# Patient Record
Sex: FEMALE | Race: WHITE | NOT HISPANIC OR LATINO | Employment: OTHER | URBAN - METROPOLITAN AREA
[De-identification: names, ages, dates, MRNs, and addresses within clinical notes are randomized per-mention and may not be internally consistent; named-entity substitution may affect disease eponyms.]

---

## 2023-07-12 DIAGNOSIS — Z87.798 HISTORY OF CONGENITAL OR GENETIC CONDITION: Primary | ICD-10-CM

## 2023-11-23 ENCOUNTER — HOSPITAL ENCOUNTER (EMERGENCY)
Facility: HOSPITAL | Age: 38
Discharge: HOME | End: 2023-11-23
Payer: COMMERCIAL

## 2023-11-23 VITALS
DIASTOLIC BLOOD PRESSURE: 90 MMHG | HEART RATE: 82 BPM | TEMPERATURE: 97.2 F | OXYGEN SATURATION: 97 % | RESPIRATION RATE: 16 BRPM | BODY MASS INDEX: 23.04 KG/M2 | WEIGHT: 130 LBS | HEIGHT: 63 IN | SYSTOLIC BLOOD PRESSURE: 140 MMHG

## 2023-11-23 DIAGNOSIS — R10.33 PERIUMBILICAL ABDOMINAL PAIN: Primary | ICD-10-CM

## 2023-11-23 PROBLEM — D80.9 IMMUNOGLOBULIN DEFICIENCY (MULTI): Status: ACTIVE | Noted: 2020-01-01

## 2023-11-23 PROBLEM — D17.22 LIPOMA OF LEFT SHOULDER: Status: ACTIVE | Noted: 2019-01-03

## 2023-11-23 PROBLEM — M25.512 ARTHRALGIA OF LEFT ACROMIOCLAVICULAR JOINT: Status: ACTIVE | Noted: 2019-01-03

## 2023-11-23 PROBLEM — M22.2X9 PATELLOFEMORAL SYNDROME: Status: ACTIVE | Noted: 2023-11-23

## 2023-11-23 PROBLEM — G43.101 MIGRAINE WITH AURA AND WITH STATUS MIGRAINOSUS: Status: ACTIVE | Noted: 2023-11-23

## 2023-11-23 PROBLEM — N95.1 MENOPAUSAL SYMPTOMS: Status: ACTIVE | Noted: 2018-07-31

## 2023-11-23 PROBLEM — F41.9 ANXIETY: Status: ACTIVE | Noted: 2023-11-23

## 2023-11-23 PROBLEM — J45.990 EXERCISE-INDUCED ASTHMA (HHS-HCC): Status: ACTIVE | Noted: 2023-11-23

## 2023-11-23 PROBLEM — J18.9 PNEUMONIA: Status: ACTIVE | Noted: 2023-11-23

## 2023-11-23 PROBLEM — N64.52 NIPPLE DISCHARGE IN FEMALE: Status: ACTIVE | Noted: 2023-11-23

## 2023-11-23 PROBLEM — M75.22 BICIPITAL TENDINITIS, LEFT SHOULDER: Status: ACTIVE | Noted: 2021-04-16

## 2023-11-23 PROBLEM — M75.82 ROTATOR CUFF TENDONITIS, LEFT: Status: ACTIVE | Noted: 2019-01-03

## 2023-11-23 PROBLEM — R53.83 FATIGUE: Status: ACTIVE | Noted: 2023-11-23

## 2023-11-23 PROBLEM — G44.229 CHRONIC TENSION-TYPE HEADACHE, NOT INTRACTABLE: Status: ACTIVE | Noted: 2023-11-23

## 2023-11-23 PROBLEM — N63.0 BREAST MASS IN FEMALE: Status: ACTIVE | Noted: 2023-11-23

## 2023-11-23 PROBLEM — M75.42 IMPINGEMENT SYNDROME OF LEFT SHOULDER: Status: ACTIVE | Noted: 2021-04-16

## 2023-11-23 PROBLEM — B00.2 HERPES GINGIVOSTOMATITIS: Status: ACTIVE | Noted: 2023-11-23

## 2023-11-23 PROBLEM — M50.30 DDD (DEGENERATIVE DISC DISEASE), CERVICAL: Status: ACTIVE | Noted: 2023-10-19

## 2023-11-23 PROCEDURE — 2500000001 HC RX 250 WO HCPCS SELF ADMINISTERED DRUGS (ALT 637 FOR MEDICARE OP)

## 2023-11-23 PROCEDURE — 2500000005 HC RX 250 GENERAL PHARMACY W/O HCPCS

## 2023-11-23 PROCEDURE — 99284 EMERGENCY DEPT VISIT MOD MDM: CPT

## 2023-11-23 PROCEDURE — 99282 EMERGENCY DEPT VISIT SF MDM: CPT

## 2023-11-23 PROCEDURE — 99285 EMERGENCY DEPT VISIT HI MDM: CPT

## 2023-11-23 RX ORDER — FAMOTIDINE 40 MG/1
40 TABLET, FILM COATED ORAL NIGHTLY PRN
Qty: 10 TABLET | Refills: 0 | Status: SHIPPED | OUTPATIENT
Start: 2023-11-23 | End: 2023-12-03

## 2023-11-23 RX ORDER — FAMOTIDINE 40 MG/1
40 TABLET, FILM COATED ORAL ONCE
Status: COMPLETED | OUTPATIENT
Start: 2023-11-23 | End: 2023-11-23

## 2023-11-23 RX ADMIN — DIPHENHYDRAMINE HYDROCHLORIDE AND LIDOCAINE HYDROCHLORIDE AND ALUMINUM HYDROXIDE AND MAGNESIUM HYDRO 10 ML: KIT at 15:22

## 2023-11-23 RX ADMIN — FAMOTIDINE 40 MG: 40 TABLET ORAL at 15:22

## 2023-11-23 ASSESSMENT — PAIN - FUNCTIONAL ASSESSMENT: PAIN_FUNCTIONAL_ASSESSMENT: 0-10

## 2023-11-23 ASSESSMENT — PAIN SCALES - GENERAL: PAINLEVEL_OUTOF10: 5 - MODERATE PAIN

## 2023-11-23 ASSESSMENT — PAIN DESCRIPTION - ONSET: ONSET: ONGOING

## 2023-11-23 ASSESSMENT — LIFESTYLE VARIABLES
REASON UNABLE TO ASSESS: NO
EVER HAD A DRINK FIRST THING IN THE MORNING TO STEADY YOUR NERVES TO GET RID OF A HANGOVER: NO
HAVE PEOPLE ANNOYED YOU BY CRITICIZING YOUR DRINKING: NO
HAVE YOU EVER FELT YOU SHOULD CUT DOWN ON YOUR DRINKING: NO
EVER FELT BAD OR GUILTY ABOUT YOUR DRINKING: NO

## 2023-11-23 ASSESSMENT — PAIN DESCRIPTION - FREQUENCY: FREQUENCY: INTERMITTENT

## 2023-11-23 ASSESSMENT — PAIN DESCRIPTION - PROGRESSION: CLINICAL_PROGRESSION: GRADUALLY IMPROVING

## 2023-11-23 ASSESSMENT — COLUMBIA-SUICIDE SEVERITY RATING SCALE - C-SSRS
2. HAVE YOU ACTUALLY HAD ANY THOUGHTS OF KILLING YOURSELF?: NO
6. HAVE YOU EVER DONE ANYTHING, STARTED TO DO ANYTHING, OR PREPARED TO DO ANYTHING TO END YOUR LIFE?: NO
1. IN THE PAST MONTH, HAVE YOU WISHED YOU WERE DEAD OR WISHED YOU COULD GO TO SLEEP AND NOT WAKE UP?: NO

## 2023-11-23 ASSESSMENT — PAIN DESCRIPTION - DESCRIPTORS: DESCRIPTORS: SORE;SHOOTING

## 2023-11-23 ASSESSMENT — PAIN DESCRIPTION - LOCATION: LOCATION: ABDOMEN

## 2023-11-23 ASSESSMENT — PAIN DESCRIPTION - PAIN TYPE: TYPE: ACUTE PAIN

## 2023-11-23 NOTE — ED PROVIDER NOTES
"HPI   Chief Complaint   Patient presents with   • Abdominal Pain       38-year-old female with history of appendicitis with appendectomy, , and tubal ligation presents for chief complaint of periumbilical pain.  Started 2 weeks ago while she was doing yoga.  States that she heard a \"snap\" and has had pain since then.  5/10 pain level.  Intermittent.  Worse on movement.  States that she exercises a lot, especially with running and walking.  She also has a 12-year-old with cerebral palsy who she must lift very often.  States that that has been difficult.  Pain 5/10 currently.  Denies any changes in urination or bowel movement.  Denies nausea or vomiting.  Denies vaginal discharge or bleeding.  Denies fever, chills, myalgia.  Speculates that it may be a hernia, but endorses no mass.                          [place] Coma Scale Score: 15                  Patient History   Past Medical History:   Diagnosis Date   • Personal history of diseases of the skin and subcutaneous tissue 2018    History of urticaria   • Personal history of other complications of pregnancy, childbirth and the puerperium 2016    History of ectopic pregnancy   • Personal history of other diseases of the musculoskeletal system and connective tissue 2018    History of arthritis   • Personal history of other diseases of the respiratory system 2018    History of asthma   • Personal history of other diseases of the respiratory system     Personal history of asthma   • Personal history of other mental and behavioral disorders 2018    History of anxiety disorder   • Personal history of other specified conditions 2018    History of headache   • Polyp of corpus uteri 2016    Endometrial polyp   • Rheumatoid arthritis, unspecified (CMS/[facility])     Rheumatoid arthritis     Past Surgical History:   Procedure Laterality Date   • ADENOIDECTOMY  2018    Adenoidectomy   • APPENDECTOMY  2013    Appendectomy "   •  SECTION, CLASSIC  2016     Section   • TONSILLECTOMY  2013    Tonsillectomy With Adenoidectomy   • TONSILLECTOMY  2018    Tonsillectomy   • TUBAL LIGATION  2013    Tubal Ligation     No family history on file.  Social History     Tobacco Use   • Smoking status: Not on file   • Smokeless tobacco: Not on file   Substance Use Topics   • Alcohol use: Not on file   • Drug use: Not on file       Physical Exam   ED Triage Vitals [23 1444]   Temp Heart Rate Resp BP   36.2 °C (97.2 °F) 82 16 140/90      SpO2 Temp Source Heart Rate Source Patient Position   97 % Temporal -- --      BP Location FiO2 (%)     -- --       Physical Exam  Vitals and nursing note reviewed.   Constitutional:       General: She is not in acute distress.     Appearance: She is well-developed.   HENT:      Head: Normocephalic and atraumatic.   Eyes:      Conjunctiva/sclera: Conjunctivae normal.   Cardiovascular:      Rate and Rhythm: Normal rate and regular rhythm.      Heart sounds: No murmur heard.  Pulmonary:      Effort: Pulmonary effort is normal. No respiratory distress.      Breath sounds: Normal breath sounds.   Abdominal:      General: Abdomen is flat. There is no distension or abdominal bruit. There are no signs of injury.      Palpations: Abdomen is soft. There is no mass or pulsatile mass.      Tenderness: There is abdominal tenderness in the epigastric area. There is no right CVA tenderness, left CVA tenderness, guarding or rebound. Negative signs include Jiménez's sign and McBurney's sign.      Hernia: No hernia is present.   Musculoskeletal:         General: No swelling.      Cervical back: Neck supple.   Skin:     General: Skin is warm and dry.      Capillary Refill: Capillary refill takes less than 2 seconds.   Neurological:      Mental Status: She is alert.   Psychiatric:         Mood and Affect: Mood normal.         ED Course & MDM   Diagnoses as of 23 1525   Periumbilical  "abdominal pain       Medical Decision Making  Vital signs reviewed, unremarkable at this time.  Patient is well-appearing and in no apparent distress.  Speaks in full sentences without difficulty.  Patient endorsed pain that occurred initially with exercises, while she was doing yoga 2 weeks ago.  Stated that she heard a \"snap\" and felt immediate pain that is been intermittent since then, while holding a long stretch.  She does endorse some associated epigastric discomfort, this is felt to be less likely an ulcer/gastritis, and more likely to be abdominal muscle strain.  No mass, pulsatile or otherwise, was felt abdominal exam.  No red flag symptoms or findings.  BMX and Pepcid given.  Will be prescribed Pepcid as well.  Endorsed having Tylenol at home.  Advised to take as needed.  Encouraged to return with any new or worsening symptoms and to follow-up with primary care soon as possible.  Patient did agree with this plan.  Discharged in stable condition.        Procedure  Procedures     Jovanni Torre, APRN-CNP  11/23/23 1525    "

## 2023-11-23 NOTE — ED TRIAGE NOTES
Pt did yoga class 2 weeks ago pt was stretching in heard a pop in her stomach, pt having stomach pain

## 2024-01-04 ENCOUNTER — ANCILLARY PROCEDURE (OUTPATIENT)
Dept: RADIOLOGY | Facility: CLINIC | Age: 39
End: 2024-01-04
Payer: COMMERCIAL

## 2024-01-04 DIAGNOSIS — N63.12 UNSPECIFIED LUMP IN THE RIGHT BREAST, UPPER INNER QUADRANT: ICD-10-CM

## 2024-01-04 PROCEDURE — 76983 USE EA ADDL TARGET LESION: CPT

## 2024-01-04 PROCEDURE — 77066 DX MAMMO INCL CAD BI: CPT

## 2024-01-04 PROCEDURE — 77062 BREAST TOMOSYNTHESIS BI: CPT | Performed by: RADIOLOGY

## 2024-01-04 PROCEDURE — 76642 ULTRASOUND BREAST LIMITED: CPT | Performed by: RADIOLOGY

## 2024-01-04 PROCEDURE — 77066 DX MAMMO INCL CAD BI: CPT | Performed by: RADIOLOGY

## 2024-01-04 PROCEDURE — 76982 USE 1ST TARGET LESION: CPT

## 2024-01-04 PROCEDURE — 76642 ULTRASOUND BREAST LIMITED: CPT | Mod: 50

## 2024-01-04 PROCEDURE — 77062 BREAST TOMOSYNTHESIS BI: CPT

## 2024-08-19 ENCOUNTER — OFFICE VISIT (OUTPATIENT)
Dept: ORTHOPEDIC SURGERY | Facility: HOSPITAL | Age: 39
End: 2024-08-19
Payer: COMMERCIAL

## 2024-08-19 ENCOUNTER — HOSPITAL ENCOUNTER (OUTPATIENT)
Dept: RADIOLOGY | Facility: CLINIC | Age: 39
Discharge: HOME | End: 2024-08-19
Payer: COMMERCIAL

## 2024-08-19 ENCOUNTER — HOSPITAL ENCOUNTER (OUTPATIENT)
Dept: RADIOLOGY | Facility: HOSPITAL | Age: 39
Discharge: HOME | End: 2024-08-19
Payer: COMMERCIAL

## 2024-08-19 VITALS — WEIGHT: 135 LBS | BODY MASS INDEX: 23.92 KG/M2 | HEIGHT: 63 IN

## 2024-08-19 DIAGNOSIS — S46.012A TRAUMATIC COMPLETE TEAR OF LEFT ROTATOR CUFF, INITIAL ENCOUNTER: ICD-10-CM

## 2024-08-19 DIAGNOSIS — M25.512 ACUTE PAIN OF LEFT SHOULDER: ICD-10-CM

## 2024-08-19 DIAGNOSIS — S46.012A TRAUMATIC COMPLETE TEAR OF LEFT ROTATOR CUFF, INITIAL ENCOUNTER: Primary | ICD-10-CM

## 2024-08-19 PROCEDURE — 3008F BODY MASS INDEX DOCD: CPT | Performed by: FAMILY MEDICINE

## 2024-08-19 PROCEDURE — 1036F TOBACCO NON-USER: CPT | Performed by: FAMILY MEDICINE

## 2024-08-19 PROCEDURE — L3670 SO ACRO/CLAV CAN WEB PRE OTS: HCPCS | Performed by: FAMILY MEDICINE

## 2024-08-19 PROCEDURE — 73030 X-RAY EXAM OF SHOULDER: CPT | Mod: LEFT SIDE | Performed by: RADIOLOGY

## 2024-08-19 PROCEDURE — 73221 MRI JOINT UPR EXTREM W/O DYE: CPT | Mod: LEFT SIDE | Performed by: RADIOLOGY

## 2024-08-19 PROCEDURE — 99214 OFFICE O/P EST MOD 30 MIN: CPT | Performed by: FAMILY MEDICINE

## 2024-08-19 PROCEDURE — 99204 OFFICE O/P NEW MOD 45 MIN: CPT | Performed by: FAMILY MEDICINE

## 2024-08-19 PROCEDURE — 73030 X-RAY EXAM OF SHOULDER: CPT | Mod: LT

## 2024-08-19 PROCEDURE — 73221 MRI JOINT UPR EXTREM W/O DYE: CPT | Mod: LT

## 2024-08-19 RX ORDER — OXYCODONE AND ACETAMINOPHEN 5; 325 MG/1; MG/1
1 TABLET ORAL EVERY 8 HOURS PRN
Qty: 20 TABLET | Refills: 0 | Status: SHIPPED | OUTPATIENT
Start: 2024-08-19 | End: 2024-08-26

## 2024-08-19 ASSESSMENT — PAIN SCALES - GENERAL: PAINLEVEL_OUTOF10: 10 - WORST POSSIBLE PAIN

## 2024-08-19 ASSESSMENT — PAIN - FUNCTIONAL ASSESSMENT: PAIN_FUNCTIONAL_ASSESSMENT: 0-10

## 2024-08-19 NOTE — PROGRESS NOTES
** Please excuse any errors in grammar or translation related to this dictation. Voice recognition software was utilized to prepare this document. **    Assessment & Plan:    Dx: Left rotator cuff tear/ rotator cuff strain    Discussed with patient that current presentation is most aligned with the above diagnosis along with its mechanism, management, and anticipated outcome.     Discussed options for management of this condition and made shared decision making for the selected treatment listed below.      - Today's treatment plan: Short course of percocet for pain prn, stat MRI L shoulder  - Ongoing treatment: Activity modifications for daily life as well as exercise. Continue wearing a sling.   - Work/exercise limits: No specific restrictions. All activities as tolerated.   - Follow-up: after MRI complete follow up with ortho    All questions answered and patient is agreeable to plan of care.      Chief complaint:    HPI:  39 y/o, hx of left rotator cuff impingement (treated with corticosteroid injections), presents with left shoulder pain.  Onset of this injury was 2 days ago.  On Saturday night pt was playing in a volleyball game. She went to bump the volleyball with arms fully extended and heard a pop in her left shoulder after making contact with the volleyball. She did not fall.  This is the first time patient is seeking care for injury. Since onset of injury, symptoms have worsened.  The injury is aggravated by any movement with left shoulder. She is unable to lift it at all and rates the pain as 10/10.  Patient presents today for evaluation and further orthopedic management.  Denies previous surgery to this site.     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 She often has to lift her for transfers.     Patient Active Problem List   Diagnosis    Exercise-induced asthma ([facility])    Anxiety    Arthralgia of left acromioclavicular joint    Bicipital tendinitis, left  "shoulder    Breast mass in female    Chronic tension-type headache, not intractable    DDD (degenerative disc disease), cervical    Fatigue    Herpes gingivostomatitis    Immunoglobulin deficiency (Multi)    Impingement syndrome of left shoulder    Lipoma of left shoulder    Menopausal symptoms    Migraine with aura and with status migrainosus    Patellofemoral syndrome    Nipple discharge in female    Rotator cuff tendonitis, left    Pneumonia     Past Surgical History:   Procedure Laterality Date    ADENOIDECTOMY  2018    Adenoidectomy    APPENDECTOMY  2013    Appendectomy     SECTION, CLASSIC  2016     Section    TONSILLECTOMY  2013    Tonsillectomy With Adenoidectomy    TONSILLECTOMY  2018    Tonsillectomy    TUBAL LIGATION  2013    Tubal Ligation     Current Outpatient Medications on File Prior to Visit   Medication Sig Dispense Refill    famotidine (Pepcid) 40 mg tablet Take 1 tablet (40 mg) by mouth as needed at bedtime for heartburn for up to 10 days. 10 tablet 0     No current facility-administered medications on file prior to visit.       Exam:  Left Shoulder Exam:  No swelling, warmth or ecchymosis of shoulder present.   AROM: Severely limited AROM  No TTP over clavicle, AC joint, deltoid, trapezius  TTP along subacromial space, posterior GHJ line, LHB tendon  0/5 strength in ER, IR, abduction, flexion   SILT in all dermatomal distributions C5-T1    Unable to perform special testing as patient was unable to lift arm     Results:    Left shoulder xray: negative for acute fracture    No results found for: \"HGBA1C\", \"CREATININE\", \"EGFR\"    Procedure: n/a  "

## 2024-11-13 ENCOUNTER — APPOINTMENT (OUTPATIENT)
Dept: RADIOLOGY | Facility: HOSPITAL | Age: 39
End: 2024-11-13
Payer: MEDICARE

## 2024-11-13 ENCOUNTER — HOSPITAL ENCOUNTER (EMERGENCY)
Facility: HOSPITAL | Age: 39
Discharge: HOME | End: 2024-11-13
Attending: EMERGENCY MEDICINE
Payer: MEDICARE

## 2024-11-13 VITALS
HEART RATE: 72 BPM | DIASTOLIC BLOOD PRESSURE: 88 MMHG | TEMPERATURE: 97.4 F | RESPIRATION RATE: 16 BRPM | BODY MASS INDEX: 24.45 KG/M2 | OXYGEN SATURATION: 97 % | WEIGHT: 138 LBS | HEIGHT: 63 IN | SYSTOLIC BLOOD PRESSURE: 131 MMHG

## 2024-11-13 DIAGNOSIS — S16.1XXA STRAIN OF NECK MUSCLE, INITIAL ENCOUNTER: ICD-10-CM

## 2024-11-13 DIAGNOSIS — S06.0XAA CONCUSSION WITH UNKNOWN LOSS OF CONSCIOUSNESS STATUS, INITIAL ENCOUNTER: ICD-10-CM
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
## 2024-11-13 LAB — PREGNANCY TEST URINE, POC: NEGATIVE

## 2024-11-13 PROCEDURE — 2500000005 HC RX 250 GENERAL PHARMACY W/O HCPCS

## 2024-11-13 PROCEDURE — 81025 URINE PREGNANCY TEST: CPT

## 2024-11-13 PROCEDURE — 70491 CT SOFT TISSUE NECK W/DYE: CPT | Performed by: RADIOLOGY

## 2024-11-13 PROCEDURE — 99285 EMERGENCY DEPT VISIT HI MDM: CPT

## 2024-11-13 PROCEDURE — 99285 EMERGENCY DEPT VISIT HI MDM: CPT | Mod: 25

## 2024-11-13 PROCEDURE — 2550000001 HC RX 255 CONTRASTS: Performed by: EMERGENCY MEDICINE

## 2024-11-13 PROCEDURE — 70450 CT HEAD/BRAIN W/O DYE: CPT | Performed by: RADIOLOGY

## 2024-11-13 PROCEDURE — 2500000001 HC RX 250 WO HCPCS SELF ADMINISTERED DRUGS (ALT 637 FOR MEDICARE OP)

## 2024-11-13 PROCEDURE — 72128 CT CHEST SPINE W/O DYE: CPT

## 2024-11-13 PROCEDURE — 72125 CT NECK SPINE W/O DYE: CPT

## 2024-11-13 PROCEDURE — 72128 CT CHEST SPINE W/O DYE: CPT | Mod: FOREIGN READ | Performed by: RADIOLOGY

## 2024-11-13 PROCEDURE — 70491 CT SOFT TISSUE NECK W/DYE: CPT

## 2024-11-13 PROCEDURE — 72125 CT NECK SPINE W/O DYE: CPT | Performed by: RADIOLOGY

## 2024-11-13 PROCEDURE — 70450 CT HEAD/BRAIN W/O DYE: CPT

## 2024-11-13 RX ORDER — METHOCARBAMOL 500 MG/1
500 TABLET, FILM COATED ORAL ONCE
Status: COMPLETED | OUTPATIENT
Start: 2024-11-13 | End: 2024-11-13

## 2024-11-13 RX ORDER — METHOCARBAMOL 500 MG/1
500 TABLET, FILM COATED ORAL 2 TIMES DAILY PRN
Qty: 20 TABLET | Refills: 0 | Status: SHIPPED | OUTPATIENT
Start: 2024-11-13

## 2024-11-13 RX ORDER — ACETAMINOPHEN 325 MG/1
650 TABLET ORAL ONCE
Status: COMPLETED | OUTPATIENT
Start: 2024-11-13 | End: 2024-11-13

## 2024-11-13 RX ORDER — IBUPROFEN 600 MG/1
600 TABLET ORAL ONCE
Status: COMPLETED | OUTPATIENT
Start: 2024-11-13 | End: 2024-11-13

## 2024-11-13 RX ORDER — ONDANSETRON 4 MG/1
4 TABLET, FILM COATED ORAL EVERY 8 HOURS PRN
Qty: 15 TABLET | Refills: 0 | Status: SHIPPED | OUTPATIENT
Start: 2024-11-13

## 2024-11-13 RX ORDER — IBUPROFEN 800 MG/1
800 TABLET ORAL EVERY 8 HOURS PRN
Qty: 20 TABLET | Refills: 0 | Status: SHIPPED | OUTPATIENT
Start: 2024-11-13

## 2024-11-13 RX ORDER — LIDOCAINE 560 MG/1
2 PATCH PERCUTANEOUS; TOPICAL; TRANSDERMAL ONCE
Status: DISCONTINUED | OUTPATIENT
Start: 2024-11-13 | End: 2024-11-13 | Stop reason: HOSPADM

## 2024-11-13 RX ADMIN — METHOCARBAMOL 500 MG: 500 TABLET ORAL at 15:14

## 2024-11-13 RX ADMIN — LIDOCAINE 2 PATCH: 4 PATCH TOPICAL at 10:33

## 2024-11-13 RX ADMIN — IOHEXOL 80 ML: 350 INJECTION, SOLUTION INTRAVENOUS at 13:31

## 2024-11-13 RX ADMIN — ACETAMINOPHEN 650 MG: 325 TABLET ORAL at 10:33

## 2024-11-13 RX ADMIN — IBUPROFEN 600 MG: 600 TABLET, FILM COATED ORAL at 15:14
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
## 2024-11-13 NOTE — Clinical Note
María Elena Gray was seen and treated in our emergency department on 11/13/2024.  She may return to work on 11/15/2024.       If you have any questions or concerns, please don't hesitate to call.      Ricardo Florain, DO

## 2024-11-13 NOTE — DISCHARGE INSTRUCTIONS
Likely, you are experiencing a concussion.  Typical symptoms of a concussion are headache, brain fogginess, dizziness, nausea, amnesia of the event.  Typical treatment for concussion is mental and physical rest.  Gradual increase of activity is recommended over the next 2 to 3 weeks.  If you still have symptoms of concussion after this, please follow-up with primary care.  Take a break on screens, reading, etc. for mental rest as well.